# Patient Record
Sex: FEMALE | Race: WHITE | NOT HISPANIC OR LATINO
[De-identification: names, ages, dates, MRNs, and addresses within clinical notes are randomized per-mention and may not be internally consistent; named-entity substitution may affect disease eponyms.]

---

## 2019-03-21 ENCOUNTER — RESULT REVIEW (OUTPATIENT)
Age: 29
End: 2019-03-21

## 2019-08-15 ENCOUNTER — OUTPATIENT (OUTPATIENT)
Dept: OUTPATIENT SERVICES | Facility: HOSPITAL | Age: 29
LOS: 1 days | End: 2019-08-15
Payer: COMMERCIAL

## 2019-08-15 VITALS
HEIGHT: 64 IN | OXYGEN SATURATION: 100 % | HEART RATE: 62 BPM | DIASTOLIC BLOOD PRESSURE: 69 MMHG | RESPIRATION RATE: 14 BRPM | SYSTOLIC BLOOD PRESSURE: 114 MMHG | WEIGHT: 153.44 LBS | TEMPERATURE: 98 F

## 2019-08-15 DIAGNOSIS — Z90.49 ACQUIRED ABSENCE OF OTHER SPECIFIED PARTS OF DIGESTIVE TRACT: Chronic | ICD-10-CM

## 2019-08-15 DIAGNOSIS — N62 HYPERTROPHY OF BREAST: ICD-10-CM

## 2019-08-15 DIAGNOSIS — Z01.818 ENCOUNTER FOR OTHER PREPROCEDURAL EXAMINATION: ICD-10-CM

## 2019-08-15 DIAGNOSIS — L81.9 DISORDER OF PIGMENTATION, UNSPECIFIED: ICD-10-CM

## 2019-08-15 LAB
HCT VFR BLD CALC: 37.1 % — SIGNIFICANT CHANGE UP (ref 34.5–45)
HGB BLD-MCNC: 11.3 G/DL — LOW (ref 11.5–15.5)
MCHC RBC-ENTMCNC: 20.3 PG — LOW (ref 27–34)
MCHC RBC-ENTMCNC: 30.5 GM/DL — LOW (ref 32–36)
MCV RBC AUTO: 66.7 FL — LOW (ref 80–100)
NRBC # BLD: 0 /100 WBCS — SIGNIFICANT CHANGE UP (ref 0–0)
PLATELET # BLD AUTO: 233 K/UL — SIGNIFICANT CHANGE UP (ref 150–400)
RBC # BLD: 5.56 M/UL — HIGH (ref 3.8–5.2)
RBC # FLD: 15.9 % — HIGH (ref 10.3–14.5)
WBC # BLD: 4.84 K/UL — SIGNIFICANT CHANGE UP (ref 3.8–10.5)
WBC # FLD AUTO: 4.84 K/UL — SIGNIFICANT CHANGE UP (ref 3.8–10.5)

## 2019-08-15 PROCEDURE — G0463: CPT

## 2019-08-15 PROCEDURE — 85027 COMPLETE CBC AUTOMATED: CPT

## 2019-08-15 PROCEDURE — 36415 COLL VENOUS BLD VENIPUNCTURE: CPT

## 2019-08-15 NOTE — H&P PST ADULT - NSICDXPROBLEM_GEN_ALL_CORE_FT
PROBLEM DIAGNOSES  Problem: Hypertrophy of breast  Assessment and Plan: Bilateral Breast Reduction, Liposuction scheduled for 8/28/2019  Pre-op instructions given. Pt verbalized understanding  Chlorhexidine wash instructions given  Pepcid given for GI prophylaxis  UCG ordered STAT for day of procedure PROBLEM DIAGNOSES  Problem: Hypertrophy of breast  Assessment and Plan: PST Bilateral Breast Reduction, Liposuction scheduled for 8/28/2019  Pre-op instructions given. Pt verbalized understanding  Pepcid & Chlorhexidine instructions given  UCG ordered STAT for day of procedure    Problem: Abnormal pigmentation of skin  Assessment and Plan: hyperpigmentation of skin, right breast  Pending: Dermatology visit note - appt today 8/15/2019

## 2019-08-15 NOTE — H&P PST ADULT - HISTORY OF PRESENT ILLNESS
30yo female denies significant medical history c/o large breast and presents today for PST Bilateral Breast Reduction, Liposuction scheduled for 8/28/2019

## 2019-08-15 NOTE — H&P PST ADULT - NSANTHOSAYNRD_GEN_A_CORE
neck 14.25inches/No. SHORTY screening performed.  STOP BANG Legend: 0-2 = LOW Risk; 3-4 = INTERMEDIATE Risk; 5-8 = HIGH Risk

## 2019-08-15 NOTE — H&P PST ADULT - SKIN/BREAST COMMENTS
c/o large breast c/o large breast and states she has hyperpigmentation to right breast since 14 years old, denies itching and reports surgeon wants her to see dermatologist

## 2019-08-15 NOTE — H&P PST ADULT - MUSCULOSKELETAL
negative detailed exam no joint warmth/no calf tenderness/ROM intact/no joint swelling/no joint erythema/normal strength

## 2019-08-27 ENCOUNTER — TRANSCRIPTION ENCOUNTER (OUTPATIENT)
Age: 29
End: 2019-08-27

## 2019-08-28 ENCOUNTER — RESULT REVIEW (OUTPATIENT)
Age: 29
End: 2019-08-28

## 2019-08-28 ENCOUNTER — OUTPATIENT (OUTPATIENT)
Dept: OUTPATIENT SERVICES | Facility: HOSPITAL | Age: 29
LOS: 1 days | End: 2019-08-28
Payer: COMMERCIAL

## 2019-08-28 VITALS
DIASTOLIC BLOOD PRESSURE: 67 MMHG | HEART RATE: 87 BPM | OXYGEN SATURATION: 97 % | SYSTOLIC BLOOD PRESSURE: 112 MMHG | TEMPERATURE: 97 F | RESPIRATION RATE: 16 BRPM

## 2019-08-28 VITALS
RESPIRATION RATE: 15 BRPM | OXYGEN SATURATION: 98 % | HEIGHT: 64 IN | WEIGHT: 153.44 LBS | SYSTOLIC BLOOD PRESSURE: 116 MMHG | HEART RATE: 68 BPM | DIASTOLIC BLOOD PRESSURE: 66 MMHG | TEMPERATURE: 98 F

## 2019-08-28 DIAGNOSIS — N62 HYPERTROPHY OF BREAST: ICD-10-CM

## 2019-08-28 DIAGNOSIS — Z90.49 ACQUIRED ABSENCE OF OTHER SPECIFIED PARTS OF DIGESTIVE TRACT: Chronic | ICD-10-CM

## 2019-08-28 PROCEDURE — 19318 BREAST REDUCTION: CPT | Mod: 50

## 2019-08-28 PROCEDURE — 88305 TISSUE EXAM BY PATHOLOGIST: CPT

## 2019-08-28 PROCEDURE — 88305 TISSUE EXAM BY PATHOLOGIST: CPT | Mod: 26

## 2019-08-28 RX ORDER — SODIUM CHLORIDE 9 MG/ML
1000 INJECTION, SOLUTION INTRAVENOUS
Refills: 0 | Status: DISCONTINUED | OUTPATIENT
Start: 2019-08-28 | End: 2019-08-28

## 2019-08-28 RX ORDER — ONDANSETRON 8 MG/1
4 TABLET, FILM COATED ORAL ONCE
Refills: 0 | Status: DISCONTINUED | OUTPATIENT
Start: 2019-08-28 | End: 2019-08-28

## 2019-08-28 RX ORDER — OXYCODONE AND ACETAMINOPHEN 5; 325 MG/1; MG/1
1 TABLET ORAL
Qty: 20 | Refills: 0
Start: 2019-08-28 | End: 2019-09-01

## 2019-08-28 RX ORDER — HYDROMORPHONE HYDROCHLORIDE 2 MG/ML
0.5 INJECTION INTRAMUSCULAR; INTRAVENOUS; SUBCUTANEOUS
Refills: 0 | Status: DISCONTINUED | OUTPATIENT
Start: 2019-08-28 | End: 2019-08-28

## 2019-08-28 RX ORDER — OXYCODONE HYDROCHLORIDE 5 MG/1
5 TABLET ORAL
Refills: 0 | Status: DISCONTINUED | OUTPATIENT
Start: 2019-08-28 | End: 2019-08-28

## 2019-08-28 RX ORDER — OXYCODONE HYDROCHLORIDE 5 MG/1
10 TABLET ORAL
Refills: 0 | Status: DISCONTINUED | OUTPATIENT
Start: 2019-08-28 | End: 2019-08-28

## 2019-08-28 RX ORDER — ONDANSETRON 8 MG/1
1 TABLET, FILM COATED ORAL
Qty: 9 | Refills: 0
Start: 2019-08-28 | End: 2019-08-30

## 2019-08-28 RX ORDER — CEPHALEXIN 500 MG
1 CAPSULE ORAL
Qty: 28 | Refills: 0
Start: 2019-08-28 | End: 2019-09-03

## 2019-08-28 RX ORDER — ONDANSETRON 8 MG/1
4 TABLET, FILM COATED ORAL ONCE
Refills: 0 | Status: DISCONTINUED | OUTPATIENT
Start: 2019-08-28 | End: 2019-09-14

## 2019-08-28 RX ADMIN — SODIUM CHLORIDE 50 MILLILITER(S): 9 INJECTION, SOLUTION INTRAVENOUS at 06:26

## 2019-08-28 NOTE — BRIEF OPERATIVE NOTE - NSICDXBRIEFPROCEDURE_GEN_ALL_CORE_FT
PROCEDURES:  Reduction of both breasts 28-Aug-2019 12:17:57 bilateral breast reduction Samantha Sarmiento

## 2019-08-28 NOTE — ASU DISCHARGE PLAN (ADULT/PEDIATRIC) - CARE PROVIDER_API CALL
Jacky Solorzano)  Plastic Surgery  [address] [place] [id number]  Phone: [phone]  Fax: [phone]  Follow Up Time: 1 week

## 2019-08-28 NOTE — ASU DISCHARGE PLAN (ADULT/PEDIATRIC) - ASU DC SPECIAL INSTRUCTIONSFT
Keep Dressings dry and intact until office visit.  Sponge bath only   Empty and record drainage from FRANKIE drains  Follow up with Dr Solorzano on Tuesday Sept 3 call office for appointment

## 2019-08-30 LAB — SURGICAL PATHOLOGY STUDY: SIGNIFICANT CHANGE UP

## 2022-05-04 ENCOUNTER — RESULT REVIEW (OUTPATIENT)
Age: 32
End: 2022-05-04

## 2022-05-10 ENCOUNTER — NON-APPOINTMENT (OUTPATIENT)
Age: 32
End: 2022-05-10

## 2022-06-06 ENCOUNTER — APPOINTMENT (OUTPATIENT)
Dept: ANTEPARTUM | Facility: CLINIC | Age: 32
End: 2022-06-06

## 2022-11-30 ENCOUNTER — OUTPATIENT (OUTPATIENT)
Dept: OUTPATIENT SERVICES | Facility: HOSPITAL | Age: 32
LOS: 1 days | Discharge: HOME | End: 2022-11-30

## 2022-11-30 VITALS
TEMPERATURE: 98 F | HEART RATE: 88 BPM | RESPIRATION RATE: 16 BRPM | SYSTOLIC BLOOD PRESSURE: 128 MMHG | DIASTOLIC BLOOD PRESSURE: 66 MMHG

## 2022-11-30 VITALS — HEART RATE: 91 BPM | DIASTOLIC BLOOD PRESSURE: 77 MMHG | SYSTOLIC BLOOD PRESSURE: 120 MMHG

## 2022-11-30 DIAGNOSIS — Z98.890 OTHER SPECIFIED POSTPROCEDURAL STATES: Chronic | ICD-10-CM

## 2022-11-30 DIAGNOSIS — Z3A.00 WEEKS OF GESTATION OF PREGNANCY NOT SPECIFIED: ICD-10-CM

## 2022-11-30 DIAGNOSIS — O26.899 OTHER SPECIFIED PREGNANCY RELATED CONDITIONS, UNSPECIFIED TRIMESTER: ICD-10-CM

## 2022-11-30 DIAGNOSIS — Z90.49 ACQUIRED ABSENCE OF OTHER SPECIFIED PARTS OF DIGESTIVE TRACT: Chronic | ICD-10-CM

## 2022-11-30 PROCEDURE — 76818 FETAL BIOPHYS PROFILE W/NST: CPT | Mod: 26

## 2022-11-30 PROCEDURE — 99213 OFFICE O/P EST LOW 20 MIN: CPT

## 2022-11-30 NOTE — OB PROVIDER TRIAGE NOTE - HISTORY OF PRESENT ILLNESS
31 yo , EGA@38 weeks, presented to D&T with c/o contractions irregular, every 5 minutes pain 6-7/10 pt also complained that she felt that her underwaer was wet at around 430 PM, denies vaginal bleeding, and reports positive fetal movement.  Denies fever, chills, headaches, changes in vision, chest pain, palpitations, shortness of breath, cough, nausea, vomiting, diarrhea, constipation, urinary symptoms.    Prenatal care with Dr. Nguyen  Prenatal course is uncomplicated.   GBS status is negative 22  Patient denies signs and symptoms of COVID 19; denies symptomatic illness; fully vaccinated.    No adverse reactions to anesthesia, no objections to blood transfusions if clinically indicated.  OB hx:   FT 2012 9#8  complicated by PEC   FT 2016 8#11  no complication  Miscarriage x1 no D&C  Med hx: denies  Surg hx:  breast reduction 2018  cholecystectomy 2013  GYN hx: denies hx of abnormal papsmear/cysts/fibroids/STDs  Meds: PNV  Allergies: eggplant, KiWi, pinapple (hives, throat itchiness and ears), Claritin benadryl, Omeprazole ( Hives)    Social hx: Denies alcohol, tobacco, drug use  Psych hx:  hx of anxiety- no meds and therapy

## 2022-11-30 NOTE — OB PROVIDER TRIAGE NOTE - NSOBPROVIDERNOTE_OBGYN_ALL_OB_FT
33 yo , EGA@38 weeks R/O Labor  0 discuss with Dr. Jacobs  tracing reviewed  BPP 2240 tracing reviewed with Dr. Jacobs  No S/S of active labor at this time.   stable for discharge  Discharge patient home.  Labor precautions and fetal movements count were reviewed; if not in labor, will follow up with OB for the next schedule appointment.  Plan of care was reviewed with patient and family; patient states understanding of the above plan.  In total 35 minutes spent with established/new patient.

## 2022-11-30 NOTE — OB PROVIDER TRIAGE NOTE - NSHPPHYSICALEXAM_GEN_ALL_CORE
Vital Signs Last 24 Hrs  T(C): 36.9 (30 Nov 2022 20:20), Max: 36.9 (30 Nov 2022 20:20)  T(F): 98.4 (30 Nov 2022 20:20), Max: 98.4 (30 Nov 2022 20:20)  HR: 91 (30 Nov 2022 20:58) (88 - 91)  BP: 120/77 (30 Nov 2022 20:58) (120/77 - 128/66)  RR: 16 (30 Nov 2022 20:20) (16 - 16)    Gen: NAD  Head: NC/AT  Cardio: S1S2+, RRR  Resp: CTABL, no wheezing  Abdomen: Soft, NT/ND, BS+  Extremities: +1 LE edema bilaterally    NST and BPP done to assess fetal surveillance and results as follows:  NST-->FHR: 130 HR baseline, moderate variability, accelerations present, no decelerations, Category 1.  Cavour: Contractions present, irregular,    BPP:  cephalic presentation, Anterior placenta, MAGALIE 17.17 BPP 8/8    SVE: 4/50/-3

## 2022-12-07 ENCOUNTER — TRANSCRIPTION ENCOUNTER (OUTPATIENT)
Age: 32
End: 2022-12-07

## 2022-12-07 ENCOUNTER — INPATIENT (INPATIENT)
Facility: HOSPITAL | Age: 32
LOS: 1 days | Discharge: HOME | End: 2022-12-09
Attending: OBSTETRICS & GYNECOLOGY | Admitting: OBSTETRICS & GYNECOLOGY

## 2022-12-07 VITALS
HEIGHT: 65 IN | RESPIRATION RATE: 16 BRPM | HEART RATE: 76 BPM | TEMPERATURE: 98 F | WEIGHT: 138.89 LBS | DIASTOLIC BLOOD PRESSURE: 67 MMHG | SYSTOLIC BLOOD PRESSURE: 123 MMHG

## 2022-12-07 DIAGNOSIS — Z98.890 OTHER SPECIFIED POSTPROCEDURAL STATES: Chronic | ICD-10-CM

## 2022-12-07 DIAGNOSIS — Z90.49 ACQUIRED ABSENCE OF OTHER SPECIFIED PARTS OF DIGESTIVE TRACT: Chronic | ICD-10-CM

## 2022-12-07 LAB
ALBUMIN SERPL ELPH-MCNC: 3.8 G/DL — SIGNIFICANT CHANGE UP (ref 3.3–5)
ALP SERPL-CCNC: 194 U/L — HIGH (ref 40–120)
ALT FLD-CCNC: 12 U/L — SIGNIFICANT CHANGE UP (ref 4–33)
ANION GAP SERPL CALC-SCNC: 13 MMOL/L — SIGNIFICANT CHANGE UP (ref 7–14)
APPEARANCE UR: CLEAR — SIGNIFICANT CHANGE UP
APTT BLD: 30.3 SEC — SIGNIFICANT CHANGE UP (ref 27–36.3)
AST SERPL-CCNC: 20 U/L — SIGNIFICANT CHANGE UP (ref 4–32)
BASOPHILS # BLD AUTO: 0.02 K/UL — SIGNIFICANT CHANGE UP (ref 0–0.2)
BASOPHILS NFR BLD AUTO: 0.3 % — SIGNIFICANT CHANGE UP (ref 0–2)
BILIRUB SERPL-MCNC: 0.5 MG/DL — SIGNIFICANT CHANGE UP (ref 0.2–1.2)
BILIRUB UR-MCNC: NEGATIVE — SIGNIFICANT CHANGE UP
BUN SERPL-MCNC: 7 MG/DL — SIGNIFICANT CHANGE UP (ref 7–23)
CALCIUM SERPL-MCNC: 10.1 MG/DL — SIGNIFICANT CHANGE UP (ref 8.4–10.5)
CHLORIDE SERPL-SCNC: 103 MMOL/L — SIGNIFICANT CHANGE UP (ref 98–107)
CO2 SERPL-SCNC: 20 MMOL/L — LOW (ref 22–31)
COLOR SPEC: SIGNIFICANT CHANGE UP
CREAT ?TM UR-MCNC: 60 MG/DL — SIGNIFICANT CHANGE UP
CREAT SERPL-MCNC: 0.59 MG/DL — SIGNIFICANT CHANGE UP (ref 0.5–1.3)
DIFF PNL FLD: NEGATIVE — SIGNIFICANT CHANGE UP
EGFR: 123 ML/MIN/1.73M2 — SIGNIFICANT CHANGE UP
EOSINOPHIL # BLD AUTO: 0.03 K/UL — SIGNIFICANT CHANGE UP (ref 0–0.5)
EOSINOPHIL NFR BLD AUTO: 0.5 % — SIGNIFICANT CHANGE UP (ref 0–6)
FIBRINOGEN PPP-MCNC: 411 MG/DL — SIGNIFICANT CHANGE UP (ref 200–465)
GLUCOSE SERPL-MCNC: 79 MG/DL — SIGNIFICANT CHANGE UP (ref 70–99)
GLUCOSE UR QL: NEGATIVE — SIGNIFICANT CHANGE UP
HCT VFR BLD CALC: 33.7 % — LOW (ref 34.5–45)
HGB BLD-MCNC: 10.4 G/DL — LOW (ref 11.5–15.5)
IANC: 4.43 K/UL — SIGNIFICANT CHANGE UP (ref 1.8–7.4)
IMM GRANULOCYTES NFR BLD AUTO: 0.3 % — SIGNIFICANT CHANGE UP (ref 0–0.9)
INR BLD: 0.98 RATIO — SIGNIFICANT CHANGE UP (ref 0.88–1.16)
KETONES UR-MCNC: ABNORMAL
LDH SERPL L TO P-CCNC: 176 U/L — SIGNIFICANT CHANGE UP (ref 135–225)
LEUKOCYTE ESTERASE UR-ACNC: NEGATIVE — SIGNIFICANT CHANGE UP
LYMPHOCYTES # BLD AUTO: 1.09 K/UL — SIGNIFICANT CHANGE UP (ref 1–3.3)
LYMPHOCYTES # BLD AUTO: 18.5 % — SIGNIFICANT CHANGE UP (ref 13–44)
MCHC RBC-ENTMCNC: 20.8 PG — LOW (ref 27–34)
MCHC RBC-ENTMCNC: 30.9 GM/DL — LOW (ref 32–36)
MCV RBC AUTO: 67.3 FL — LOW (ref 80–100)
MONOCYTES # BLD AUTO: 0.31 K/UL — SIGNIFICANT CHANGE UP (ref 0–0.9)
MONOCYTES NFR BLD AUTO: 5.3 % — SIGNIFICANT CHANGE UP (ref 2–14)
NEUTROPHILS # BLD AUTO: 4.43 K/UL — SIGNIFICANT CHANGE UP (ref 1.8–7.4)
NEUTROPHILS NFR BLD AUTO: 75.1 % — SIGNIFICANT CHANGE UP (ref 43–77)
NITRITE UR-MCNC: NEGATIVE — SIGNIFICANT CHANGE UP
NRBC # BLD: 0 /100 WBCS — SIGNIFICANT CHANGE UP (ref 0–0)
NRBC # FLD: 0 K/UL — SIGNIFICANT CHANGE UP (ref 0–0)
PH UR: 7 — SIGNIFICANT CHANGE UP (ref 5–8)
PLATELET # BLD AUTO: 147 K/UL — LOW (ref 150–400)
POTASSIUM SERPL-MCNC: 3.8 MMOL/L — SIGNIFICANT CHANGE UP (ref 3.5–5.3)
POTASSIUM SERPL-SCNC: 3.8 MMOL/L — SIGNIFICANT CHANGE UP (ref 3.5–5.3)
PROT ?TM UR-MCNC: 8 MG/DL — SIGNIFICANT CHANGE UP
PROT SERPL-MCNC: 6.1 G/DL — SIGNIFICANT CHANGE UP (ref 6–8.3)
PROT UR-MCNC: NEGATIVE — SIGNIFICANT CHANGE UP
PROT/CREAT UR-RTO: 0.1 RATIO — SIGNIFICANT CHANGE UP (ref 0–0.2)
PROTHROM AB SERPL-ACNC: 11.4 SEC — SIGNIFICANT CHANGE UP (ref 10.5–13.4)
RBC # BLD: 5.01 M/UL — SIGNIFICANT CHANGE UP (ref 3.8–5.2)
RBC # FLD: 16.5 % — HIGH (ref 10.3–14.5)
SARS-COV-2 RNA SPEC QL NAA+PROBE: SIGNIFICANT CHANGE UP
SODIUM SERPL-SCNC: 136 MMOL/L — SIGNIFICANT CHANGE UP (ref 135–145)
SP GR SPEC: 1.01 — SIGNIFICANT CHANGE UP (ref 1.01–1.05)
URATE SERPL-MCNC: 4.9 MG/DL — SIGNIFICANT CHANGE UP (ref 2.5–7)
UROBILINOGEN FLD QL: SIGNIFICANT CHANGE UP
WBC # BLD: 5.9 K/UL — SIGNIFICANT CHANGE UP (ref 3.8–10.5)
WBC # FLD AUTO: 5.9 K/UL — SIGNIFICANT CHANGE UP (ref 3.8–10.5)

## 2022-12-07 RX ORDER — CHLORHEXIDINE GLUCONATE 213 G/1000ML
1 SOLUTION TOPICAL ONCE
Refills: 0 | Status: DISCONTINUED | OUTPATIENT
Start: 2022-12-07 | End: 2022-12-07

## 2022-12-07 RX ORDER — OXYCODONE HYDROCHLORIDE 5 MG/1
5 TABLET ORAL
Refills: 0 | Status: DISCONTINUED | OUTPATIENT
Start: 2022-12-07 | End: 2022-12-09

## 2022-12-07 RX ORDER — IBUPROFEN 200 MG
1 TABLET ORAL
Qty: 0 | Refills: 0 | DISCHARGE
Start: 2022-12-07

## 2022-12-07 RX ORDER — AER TRAVELER 0.5 G/1
1 SOLUTION RECTAL; TOPICAL EVERY 4 HOURS
Refills: 0 | Status: DISCONTINUED | OUTPATIENT
Start: 2022-12-07 | End: 2022-12-09

## 2022-12-07 RX ORDER — MAGNESIUM HYDROXIDE 400 MG/1
30 TABLET, CHEWABLE ORAL
Refills: 0 | Status: DISCONTINUED | OUTPATIENT
Start: 2022-12-07 | End: 2022-12-09

## 2022-12-07 RX ORDER — INFLUENZA VIRUS VACCINE 15; 15; 15; 15 UG/.5ML; UG/.5ML; UG/.5ML; UG/.5ML
0.5 SUSPENSION INTRAMUSCULAR ONCE
Refills: 0 | Status: DISCONTINUED | OUTPATIENT
Start: 2022-12-07 | End: 2022-12-09

## 2022-12-07 RX ORDER — OXYTOCIN 10 UNIT/ML
2 VIAL (ML) INJECTION
Qty: 30 | Refills: 0 | Status: DISCONTINUED | OUTPATIENT
Start: 2022-12-07 | End: 2022-12-08

## 2022-12-07 RX ORDER — SODIUM CHLORIDE 9 MG/ML
1000 INJECTION, SOLUTION INTRAVENOUS
Refills: 0 | Status: DISCONTINUED | OUTPATIENT
Start: 2022-12-07 | End: 2022-12-07

## 2022-12-07 RX ORDER — SODIUM CHLORIDE 9 MG/ML
3 INJECTION INTRAMUSCULAR; INTRAVENOUS; SUBCUTANEOUS EVERY 8 HOURS
Refills: 0 | Status: DISCONTINUED | OUTPATIENT
Start: 2022-12-07 | End: 2022-12-09

## 2022-12-07 RX ORDER — IBUPROFEN 200 MG
600 TABLET ORAL EVERY 6 HOURS
Refills: 0 | Status: COMPLETED | OUTPATIENT
Start: 2022-12-07 | End: 2023-11-05

## 2022-12-07 RX ORDER — ACETAMINOPHEN 500 MG
2 TABLET ORAL
Qty: 0 | Refills: 0 | DISCHARGE

## 2022-12-07 RX ORDER — LANOLIN
1 OINTMENT (GRAM) TOPICAL EVERY 6 HOURS
Refills: 0 | Status: DISCONTINUED | OUTPATIENT
Start: 2022-12-07 | End: 2022-12-09

## 2022-12-07 RX ORDER — OXYTOCIN 10 UNIT/ML
41.67 VIAL (ML) INJECTION
Qty: 20 | Refills: 0 | Status: DISCONTINUED | OUTPATIENT
Start: 2022-12-07 | End: 2022-12-08

## 2022-12-07 RX ORDER — NORETHINDRONE AND ETHINYL ESTRADIOL 0.4-0.035
1 KIT ORAL
Qty: 0 | Refills: 0 | DISCHARGE

## 2022-12-07 RX ORDER — TETANUS TOXOID, REDUCED DIPHTHERIA TOXOID AND ACELLULAR PERTUSSIS VACCINE, ADSORBED 5; 2.5; 8; 8; 2.5 [IU]/.5ML; [IU]/.5ML; UG/.5ML; UG/.5ML; UG/.5ML
0.5 SUSPENSION INTRAMUSCULAR ONCE
Refills: 0 | Status: DISCONTINUED | OUTPATIENT
Start: 2022-12-07 | End: 2022-12-09

## 2022-12-07 RX ORDER — BENZOCAINE 10 %
1 GEL (GRAM) MUCOUS MEMBRANE EVERY 6 HOURS
Refills: 0 | Status: DISCONTINUED | OUTPATIENT
Start: 2022-12-07 | End: 2022-12-09

## 2022-12-07 RX ORDER — KETOROLAC TROMETHAMINE 30 MG/ML
30 SYRINGE (ML) INJECTION ONCE
Refills: 0 | Status: DISCONTINUED | OUTPATIENT
Start: 2022-12-07 | End: 2022-12-07

## 2022-12-07 RX ORDER — OXYCODONE HYDROCHLORIDE 5 MG/1
5 TABLET ORAL ONCE
Refills: 0 | Status: DISCONTINUED | OUTPATIENT
Start: 2022-12-07 | End: 2022-12-09

## 2022-12-07 RX ORDER — SIMETHICONE 80 MG/1
80 TABLET, CHEWABLE ORAL EVERY 4 HOURS
Refills: 0 | Status: DISCONTINUED | OUTPATIENT
Start: 2022-12-07 | End: 2022-12-09

## 2022-12-07 RX ORDER — CITRIC ACID/SODIUM CITRATE 300-500 MG
15 SOLUTION, ORAL ORAL EVERY 6 HOURS
Refills: 0 | Status: DISCONTINUED | OUTPATIENT
Start: 2022-12-07 | End: 2022-12-07

## 2022-12-07 RX ORDER — ACETAMINOPHEN 500 MG
975 TABLET ORAL
Refills: 0 | Status: DISCONTINUED | OUTPATIENT
Start: 2022-12-07 | End: 2022-12-09

## 2022-12-07 RX ORDER — OXYTOCIN 10 UNIT/ML
333.33 VIAL (ML) INJECTION
Qty: 20 | Refills: 0 | Status: DISCONTINUED | OUTPATIENT
Start: 2022-12-07 | End: 2022-12-08

## 2022-12-07 RX ORDER — PRAMOXINE HYDROCHLORIDE 150 MG/15G
1 AEROSOL, FOAM RECTAL EVERY 4 HOURS
Refills: 0 | Status: DISCONTINUED | OUTPATIENT
Start: 2022-12-07 | End: 2022-12-09

## 2022-12-07 RX ORDER — HYDROCORTISONE 1 %
1 OINTMENT (GRAM) TOPICAL EVERY 6 HOURS
Refills: 0 | Status: DISCONTINUED | OUTPATIENT
Start: 2022-12-07 | End: 2022-12-09

## 2022-12-07 RX ORDER — DIBUCAINE 1 %
1 OINTMENT (GRAM) RECTAL EVERY 6 HOURS
Refills: 0 | Status: DISCONTINUED | OUTPATIENT
Start: 2022-12-07 | End: 2022-12-09

## 2022-12-07 RX ADMIN — Medication 2 MILLIUNIT(S)/MIN: at 13:32

## 2022-12-07 RX ADMIN — Medication 125 MILLIUNIT(S)/MIN: at 21:50

## 2022-12-07 RX ADMIN — Medication 30 MILLIGRAM(S): at 21:47

## 2022-12-07 RX ADMIN — Medication 30 MILLIGRAM(S): at 22:14

## 2022-12-07 NOTE — OB PROVIDER H&P - ATTENDING COMMENTS
OB Attending Note    P2 sent from office for elevated BP.   concern for gHTN.   Admit for IOL.  AROM w/ pitocin augmentation.     ALBAN Mann MD

## 2022-12-07 NOTE — OB RN DELIVERY SUMMARY - NS_SEPSISRSKCALC_OBGYN_ALL_OB_FT
EOS calculated successfully. EOS Risk Factor: 0.5/1000 live births ([facility] national incidence); GA=39w;Temp=98.42; ROM=6.467; GBS='Negative'; Antibiotics='No antibiotics or any antibiotics < 2 hrs prior to birth'

## 2022-12-07 NOTE — OB RN DELIVERY SUMMARY - NSSELHIDDEN_OBGYN_ALL_OB_FT
[NS_DeliveryAttending1_OBGYN_ALL_OB_FT:[id number]],[NS_DeliveryRN_OBGYN_ALL_OB_FT:[id number]=]

## 2022-12-07 NOTE — OB RN PATIENT PROFILE - FALL HARM RISK - UNIVERSAL INTERVENTIONS
Bed in lowest position, wheels locked, appropriate side rails in place/Call bell, personal items and telephone in reach/Instruct patient to call for assistance before getting out of bed or chair/Non-slip footwear when patient is out of bed/[place] to call system/Physically safe environment - no spills, clutter or unnecessary equipment/Purposeful Proactive Rounding/Room/bathroom lighting operational, light cord in reach

## 2022-12-07 NOTE — OB PROVIDER H&P - HISTORY OF PRESENT ILLNESS
HPI: Pt is a 31 yo  at 39 weeks admitted for early labor   FM +  LOF denies  CTX denies  VB denies    Prenatal Issues:   GBS negative   denies Sono anomolies, genetic testing, infections, IVF  EFW 3628 grams     OBHx:  - 2012 FT /F/9#5/uncomplicated  -2016 FT /8#11/uncomplicated     Gyn Hx:  - right ovarian cyst   - denies  fibroids, ovarian, PID, STDs, abnormal paps, HPV, infertility, GYN surgery     PMH:   - denies    SHx:  -  cholecystectomy  - 2018 breast reduction and lift      Psych:   - denies  h/o depression/anxiety, PP depression    Social:  - denies tobacco, alcohol, drugs in pregnancy and before    Medications: PNV    Allergies: benadryl - allergic reaction in childhood. pt unsure of what happens                claritin D- hives    Will Accept blood transfusion? yes     Vital Signs Last 24 Hrs  T(C): 36.7 (07 Dec 2022 12:06), Max: 36.7 (07 Dec 2022 12:04)  T(F): 98.06 (07 Dec 2022 12:06), Max: 98.1 (07 Dec 2022 12:04)  HR: 76 (07 Dec 2022 12:07) (76 - 76)  BP: 123/67 (07 Dec 2022 12:07) (123/67 - 123/67)  BP(mean): --  RR: 16 (07 Dec 2022 12:04) (16 - 16)  SpO2: --        Physical Exam:  Gen: NAD, AOx3  CV: RR  Resp: unlabored respirations  Abd: soft, NT, gravid       SVE: 5/60/-3  FHT: 140/mod variability/+accels/no decels  [place]: irregular  EFW: 3628 grams  Sono: fetal presentation, vertex      A/P: Pt is a 31 yo  who presents in early labor      1. Admit to LND. Routine Labs. IVF  2.. IOL via pitocin   3. Fetus: Cat 1 tracing, Vertex, EFW 3628 grams . C/w EFM.  4. HELLP labs pending   5. Pain: IV pain meds/epidural PRN    d/w Dr Layo lambert PA-C

## 2022-12-07 NOTE — OB RN PATIENT PROFILE - AS SC BRADEN SENSORY
PT tx     02/16/22 1530   PT Last Visit   PT Visit Date 02/16/22   Note Type   Note Type Treatment   Pain Assessment   Pain Assessment Tool 0-10   Pain Score No Pain   Restrictions/Precautions   Weight Bearing Precautions Per Order No   General   Chart Reviewed Yes   Additional Pertinent History steroid continue pt reports best improvement right after infusion completed   Cognition   Overall Cognitive Status WFL   Arousal/Participation Alert   Attention Within functional limits   Orientation Level Oriented X4   Memory Within functional limits   Following Commands Follows all commands and directions without difficulty   Subjective   Subjective Pt agrees to tx, states was better right after steroid infusion  R sided weakness persists   Bed Mobility   Supine to Sit 4  Minimal assistance   Additional items Assist x 1; Increased time required;Verbal cues;LE management; Bedrails;HOB elevated   Transfers   Sit to Stand 3  Moderate assistance   Additional items Assist x 1; Increased time required;Verbal cues;Armrests  (bed raised)   Stand to Sit 4  Minimal assistance   Additional items Assist x 1;Trapeze bar;Verbal cues; Bedrails;Armrests   Stand pivot 3  Moderate assistance   Additional items Assist x 1; Armrests; Increased time required; Bedrails;Verbal cues  (rw)   Ambulation/Elevation   Gait pattern Improper Weight shift; Forward Flexion; Shuffling; Inconsistent sigrid;Decreased R stance; Short stride;R Hemiparesis  (assist to advance R le)   Gait Assistance 3  Moderate assist   Additional items Assist x 1;Verbal cues; Tactile cues   Assistive Device Rolling walker   Distance 5'   Balance   Static Sitting Fair -   Dynamic Sitting Fair -   Static Standing Fair -   Dynamic Standing Poor +   Ambulatory Poor +   Endurance Deficit   Endurance Deficit Yes   Endurance Deficit Description tires quickly   Activity Tolerance   Activity Tolerance Patient limited by fatigue   Nurse Made Aware LADI Francis   Exercises   Knee AROM Long Arc Quad Sitting;10 reps;AROM; Right;Left   Balance training  sit<>stand x 3 reps with Ax1 and rw   Assessment   Prognosis Good   Problem List Decreased strength;Decreased endurance; Impaired balance;Decreased mobility; Decreased coordination; Impaired sensation   Assessment Pt able to mobilize with Ax1 and rw  Does best moving to his L  Instr to turn chair when retuening to bed  R hemiparesis persists    Con't to recommned in-pt rehab at d/c  Will attmpet to see post steroid infusion per pt requenst   Barriers to Discharge   (medical status)   Goals   Patient Goals get better   Plan   Treatment/Interventions ADL retraining;Functional transfer training;LE strengthening/ROM; Therapeutic exercise; Endurance training;Patient/family training;Equipment eval/education; Bed mobility;Gait training;Spoke to nursing;OT   Progress Slow progress, medical status limitations   PT Frequency 3-5x/wk   Recommendation   PT Discharge Recommendation Post acute rehabilitation services   Equipment Recommended [address] [facility] Recommended Wheeled walker   AM-PAC Basic Mobility Inpatient   Turning in Bed Without Bedrails 2   Lying on Back to Sitting on Edge of Flat Bed 2   Moving Bed to Chair 2   Standing Up From Chair 2   Walk in Room 2   Climb 3-5 Stairs 1   Basic Mobility Inpatient Raw Score 11   Basic Mobility Standardized Score 30 25   Highest Level Of Mobility   JH-HLM Goal 4: Move to chair/commode   JH-HLM Highest Level of Mobility 4: Move to chair/commode   JH-HLM Goal Achieved Yes   End of Consult   Patient Position at End of Consult Bedside chair; All needs within reach   Dimitri Campos, PT (4) no impairment

## 2022-12-07 NOTE — DISCHARGE NOTE OB - MATERIALS PROVIDED
Vaccinations/[facility]  Screening Program/  Immunization Record/Breastfeeding Log/Guide to Postpartum Care/[facility] Hearing Screen Program/Birth Certificate Instructions/Tdap Vaccination (VIS Pub Date: 2012)

## 2022-12-07 NOTE — DISCHARGE NOTE OB - NS MD DC FALL RISK RISK
For information on Fall & Injury Prevention, visit: [email or web address] OR  [email or web address] OR  https://www.cdc.gov/steadi/patient.html

## 2022-12-07 NOTE — DISCHARGE NOTE OB - CARE PLAN
Principal Discharge DX:	Vaginal delivery  Assessment and plan of treatment:	return visit x 6 weeks, pelvic rest, regular diet   1

## 2022-12-07 NOTE — OB NEONATOLOGY/PEDIATRICIAN DELIVERY SUMMARY - NSPEDSNEONOTESA_OBGYN_ALL_OB_FT
Patient also notified in the voicemail that Dr. Carlos Pelletier needed to see him in the office for further treatment. Peds called to LDR for Code 100 for shoulder dystocia of R arm, with 1 minute for extraction . 39 wk LGA female born via  to a 33 y/o  mother.  Maternal history of breast reduction. Prenatal history of IOL for high blood pressure in office. Maternal labs include Blood Type  O+ , HIV - , RPR NR , Rubella I , Hep B - , GBS - on , COVID pending. SROM at 13:26 with clear fluids (ROM hours: 6). Code 100 called for R shoulder dystocia. Baby emerged crying, was warmed, dried suctioned and stimulated with APGARS of 8/9 for color . Resuscitation included: bulb suction. Mom plans to initiate breastfeeding, consents Hep B vaccine.  Highest maternal temp: 37.0 . EOS 0.12 .

## 2022-12-07 NOTE — OB RN PATIENT PROFILE - FUNCTIONAL ASSESSMENT - DAILY ACTIVITY 5.
Date last seen: 2022  Date of next visit: 04/15/2022    Medication Requested: Routed to MD for review/approval of medications as requested per patient.     PDMP Reviewed:  Lisdexamfetamine Dimesylate  40MG / Capsule  Rx# [id number] Stimulant Qty: 30  Days: 30  Refills: 0 Prescribed: 2/15/2022  Dispensed: 2/15/2022  Sold: SAMUEL Montesinos  [facility] [address] [facility]  [address] [facility] [address] TEGAN ROBERTS  : 1990 [address] [facility] [address]  Pay Type: Commercial Insurance         Outpatient Current Medications as of as of 3/15/2022       Disp Refills Start End    lisdexamfetamine (Vyvanse) 40 MG capsule 30 capsule 0 2/15/2022     Sig - Route: Take 1 capsule by mouth every morning. - Oral    Class: Eprescribe    Earliest Fill Date: 2/15/2022       BP Readings from Last 1 Encounters:   22 112/80         
[place] PDMP was checked.  Last refill of Vyvanse given 2/15/22 for a quantity of 30.  Red flags none.  Rx approved.  
4 = No assist / stand by assistance

## 2022-12-07 NOTE — DISCHARGE NOTE OB - MEDICATION SUMMARY - MEDICATIONS TO STOP TAKING
I will STOP taking the medications listed below when I get home from the hospital:    Blisovi 24 FE oral tablet  -- 1 tab(s) by mouth once a day    oxycodone-acetaminophen 5 mg-325 mg oral tablet  -- 1 tab(s) by mouth every 6 hours MDD:4  -- Caution federal law prohibits the transfer of this drug to any person other  than the person for whom it was prescribed.  May cause drowsiness.  Alcohol may intensify this effect.  Use care when operating dangerous machinery.  This prescription cannot be refilled.  This product contains acetaminophen.  Do not use  with any other product containing acetaminophen to prevent possible liver damage.  Using more of this medication than prescribed may cause serious breathing problems.    Zofran ODT 4 mg oral tablet, disintegrating  -- 1 tab(s) by mouth 3 times a day MDD:3    Keflex 500 mg oral capsule  -- 1 cap(s) by mouth 4 times a day MDD:4  -- Finish all this medication unless otherwise directed by prescriber.

## 2022-12-07 NOTE — OB PROVIDER H&P - NSLOWPPHRISK_OBGYN_A_OB
No previous uterine incision/[place] Pregnancy/Less than or equal to 4 previous vaginal births/No known bleeding disorder/No history of postpartum hemorrhage/No other PPH risks indicated

## 2022-12-07 NOTE — DISCHARGE NOTE OB - CARE PROVIDER_API CALL
Alyx Nguyen)  OBSGYN  General  [address], [place] [address]  Phone: [phone]  Fax: [phone]  Follow Up Time:

## 2022-12-07 NOTE — CHART NOTE - NSCHARTNOTEFT_GEN_A_CORE
OB Attending Progress Note    Patient seen and evaluated at bedside.  Denies complaints.     SVE: 4/60/-3    EFM: 125/mod stephanie/+accel, -decel  [place]:  ctx q3 mins    A/P 32y P2  admitted for IOL gHTN.    -Labor: AROM performed- clear fluid. to start pitocin augmentation  -Fetus: cat 1  -GBS negative  -Analgesia: declines at this time    ALBAN Mann MD

## 2022-12-07 NOTE — DISCHARGE NOTE OB - MEDICATION SUMMARY - MEDICATIONS TO TAKE
I will START or STAY ON the medications listed below when I get home from the hospital:    ibuprofen 600 mg oral tablet  -- 1 tab(s) by mouth every 6 hours  -- Indication: For as needed for pain

## 2022-12-07 NOTE — DISCHARGE NOTE OB - PATIENT PORTAL LINK FT
You can access the FollowMyHealth Patient Portal offered by [facility] by registering at the following website: [email or web address]. By joining [employer]’s FollowMyHealth portal, you will also be able to view your health information using other applications (apps) compatible with our system.

## 2022-12-07 NOTE — OB PROVIDER DELIVERY SUMMARY - NSPROVIDERDELIVERYNOTE_OBGYN_ALL_OB_FT
Patient FD and pushing.  Delivery of head over intact perineum.  Rectraction of head and shoulder dystocia noted. Code OB called.  Left shoulder anterior.   Rupali and subrapubic pressure applied.  Smith cork screw  with rotation and delivery of posterior (right shoulder) performed.  Peds was in attendance.  Apgars 8+9.  FROM all extremities.  Placenta spontaneous intact.  Cervix, vagina and perineum intact.  Fundus firm and uterus empty.  .  Mother and baby in stable condition.  JUDITH Jacobs

## 2022-12-08 LAB
COVID-19 SPIKE DOMAIN AB INTERP: POSITIVE
COVID-19 SPIKE DOMAIN ANTIBODY RESULT: >250 U/ML — HIGH
SARS-COV-2 IGG+IGM SERPL QL IA: >250 U/ML — HIGH
SARS-COV-2 IGG+IGM SERPL QL IA: POSITIVE
T PALLIDUM AB TITR SER: NEGATIVE — SIGNIFICANT CHANGE UP

## 2022-12-08 RX ORDER — IBUPROFEN 200 MG
600 TABLET ORAL EVERY 6 HOURS
Refills: 0 | Status: DISCONTINUED | OUTPATIENT
Start: 2022-12-08 | End: 2022-12-09

## 2022-12-08 RX ADMIN — SODIUM CHLORIDE 3 MILLILITER(S): 9 INJECTION INTRAMUSCULAR; INTRAVENOUS; SUBCUTANEOUS at 06:16

## 2022-12-08 RX ADMIN — Medication 600 MILLIGRAM(S): at 17:46

## 2022-12-08 RX ADMIN — SODIUM CHLORIDE 3 MILLILITER(S): 9 INJECTION INTRAMUSCULAR; INTRAVENOUS; SUBCUTANEOUS at 23:47

## 2022-12-08 RX ADMIN — SODIUM CHLORIDE 3 MILLILITER(S): 9 INJECTION INTRAMUSCULAR; INTRAVENOUS; SUBCUTANEOUS at 14:00

## 2022-12-08 RX ADMIN — Medication 975 MILLIGRAM(S): at 01:30

## 2022-12-08 RX ADMIN — Medication 600 MILLIGRAM(S): at 18:21

## 2022-12-08 RX ADMIN — Medication 975 MILLIGRAM(S): at 22:00

## 2022-12-08 RX ADMIN — Medication 975 MILLIGRAM(S): at 21:15

## 2022-12-08 RX ADMIN — Medication 975 MILLIGRAM(S): at 09:10

## 2022-12-08 RX ADMIN — Medication 975 MILLIGRAM(S): at 00:42

## 2022-12-08 RX ADMIN — Medication 975 MILLIGRAM(S): at 08:35

## 2022-12-09 VITALS
RESPIRATION RATE: 17 BRPM | HEART RATE: 64 BPM | TEMPERATURE: 98 F | DIASTOLIC BLOOD PRESSURE: 64 MMHG | SYSTOLIC BLOOD PRESSURE: 138 MMHG

## 2022-12-09 RX ADMIN — Medication 975 MILLIGRAM(S): at 09:10

## 2022-12-09 RX ADMIN — SODIUM CHLORIDE 3 MILLILITER(S): 9 INJECTION INTRAMUSCULAR; INTRAVENOUS; SUBCUTANEOUS at 06:50

## 2022-12-09 RX ADMIN — Medication 600 MILLIGRAM(S): at 05:23

## 2022-12-09 RX ADMIN — Medication 975 MILLIGRAM(S): at 08:39

## 2022-12-09 RX ADMIN — Medication 600 MILLIGRAM(S): at 00:13

## 2022-12-09 NOTE — CHART NOTE - NSCHARTNOTEFT_GEN_A_CORE
S: Patient doing well. Minimal lochia. Pain controlled.    O: Vital Signs Last 24 Hrs  T(C): 36.8 (09 Dec 2022 05:38), Max: 36.8 (09 Dec 2022 05:38)  T(F): 98.2 (09 Dec 2022 05:38), Max: 98.2 (09 Dec 2022 05:38)  HR: 74 (09 Dec 2022 05:38) (72 - 81)  BP: 127/70 (09 Dec 2022 05:38) (121/62 - 131/69)  RR: 18 (09 Dec 2022 05:38) (17 - 18)  SpO2: 100% (09 Dec 2022 05:38) (98% - 100%)    Parameters below as of 09 Dec 2022 05:38  Patient On (Oxygen Delivery Method): room air        Gen: NAD  Abd: soft, NT, ND, fundus firm below umbilicus  Lochia: moderate  Ext: no tenderness    Labs:                        10.4   5.90  )-----------( 147      ( 07 Dec 2022 12:52 )             33.7       ABO Interpretation: O ( @ 12:55)    Rh Interpretation: Positive ( @ 12:55)    Antibody Screen Negative      A: 32y PPD#1 s/p  doing well.     Plan: Continue routine postpartum care.             Anticipate d/c home.

## 2023-08-24 ENCOUNTER — APPOINTMENT (OUTPATIENT)
Dept: ORTHOPEDIC SURGERY | Facility: CLINIC | Age: 33
End: 2023-08-24
Payer: COMMERCIAL

## 2023-08-24 VITALS — HEIGHT: 65 IN | WEIGHT: 142 LBS | BODY MASS INDEX: 23.66 KG/M2
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
## 2023-08-24 PROCEDURE — 99203 OFFICE O/P NEW LOW 30 MIN: CPT
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
## 2023-08-24 NOTE — ASSESSMENT
[FreeTextEntry1] : The patient was advised of the diagnosis. The natural history of the pathology was explained in full to the patient in layman's terms. All questions were answered. The risks and benefits of surgical and non-surgical treatment alternatives were explained in full to the patient.

## 2023-08-24 NOTE — IMAGING
[de-identified] : left ring finger: mass on volar side of distal phalanx bite lissett over middle phalanx and distal phalanx clean, dry, intact no signs of infection nvid

## 2024-01-03 NOTE — OB NEONATOLOGY/PEDIATRICIAN DELIVERY SUMMARY - NSNURSERYA_OBGYN_ALL_OB
Review of patient chart completed on 1/3/24 *can we get records from where she was diagnosed with this? There is nothing in care everywhere. PSRs - Please call patient to schedule an appointment with Dr. 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 [facility]

## 2024-07-23 ENCOUNTER — RESULT REVIEW (OUTPATIENT)
Age: 34
End: 2024-07-23

## 2025-02-04 ENCOUNTER — NON-APPOINTMENT (OUTPATIENT)
Age: 35
End: 2025-02-04

## 2025-02-18 ENCOUNTER — NON-APPOINTMENT (OUTPATIENT)
Age: 35
End: 2025-02-18

## 2025-04-06 NOTE — ASU DISCHARGE PLAN (ADULT/PEDIATRIC) - NURSING INSTRUCTIONS
06-Apr-2025 21:42 All of discharge, safety, pain management, follow up with surgeon. Verbalized understanding of all instructions.